# Patient Record
Sex: FEMALE | Race: WHITE | NOT HISPANIC OR LATINO | URBAN - METROPOLITAN AREA
[De-identification: names, ages, dates, MRNs, and addresses within clinical notes are randomized per-mention and may not be internally consistent; named-entity substitution may affect disease eponyms.]

---

## 2017-02-20 ENCOUNTER — CONSULT (OUTPATIENT)
Dept: URBAN - METROPOLITAN AREA CLINIC 64 | Facility: CLINIC | Age: 22
End: 2017-02-20
Payer: COMMERCIAL

## 2017-02-20 PROCEDURE — 99203 OFFICE O/P NEW LOW 30 MIN: CPT | Performed by: OPHTHALMOLOGY

## 2017-02-20 ASSESSMENT — INTRAOCULAR PRESSURE
OS: 19
OD: 19

## 2017-06-26 ENCOUNTER — FOLLOW UP ESTABLISHED (OUTPATIENT)
Dept: URBAN - METROPOLITAN AREA CLINIC 64 | Facility: CLINIC | Age: 22
End: 2017-06-26
Payer: COMMERCIAL

## 2017-06-26 DIAGNOSIS — H05.113 GRANULOMA OF BILATERAL ORBITS: Primary | ICD-10-CM

## 2017-06-26 PROCEDURE — 99214 OFFICE O/P EST MOD 30 MIN: CPT | Performed by: OPHTHALMOLOGY

## 2017-06-26 ASSESSMENT — INTRAOCULAR PRESSURE
OD: 17
OS: 17

## 2017-06-28 ENCOUNTER — FOLLOW UP ESTABLISHED (OUTPATIENT)
Dept: URBAN - METROPOLITAN AREA CLINIC 64 | Facility: CLINIC | Age: 22
End: 2017-06-28
Payer: COMMERCIAL

## 2017-06-28 DIAGNOSIS — H52.13 MYOPIA, BILATERAL: Primary | ICD-10-CM

## 2017-06-28 PROCEDURE — 92310 CONTACT LENS FITTING OU: CPT | Performed by: OPTOMETRIST

## 2017-06-28 PROCEDURE — 92015 DETERMINE REFRACTIVE STATE: CPT | Performed by: OPTOMETRIST

## 2017-06-28 PROCEDURE — 92014 COMPRE OPH EXAM EST PT 1/>: CPT | Performed by: OPTOMETRIST

## 2017-06-28 ASSESSMENT — KERATOMETRY
OS: 43.64
OD: 43.64

## 2017-06-28 ASSESSMENT — INTRAOCULAR PRESSURE
OD: 14
OS: 14

## 2017-06-28 ASSESSMENT — VISUAL ACUITY
OD: 20/20
OS: 20/20

## 2018-09-16 PROBLEM — Z00.00 ENCOUNTER FOR PREVENTIVE HEALTH EXAMINATION: Status: ACTIVE | Noted: 2018-09-16

## 2018-10-29 ENCOUNTER — APPOINTMENT (OUTPATIENT)
Dept: ENDOCRINOLOGY | Facility: CLINIC | Age: 23
End: 2018-10-29
Payer: MEDICAID

## 2018-10-29 VITALS
WEIGHT: 211 LBS | SYSTOLIC BLOOD PRESSURE: 109 MMHG | HEIGHT: 66 IN | DIASTOLIC BLOOD PRESSURE: 74 MMHG | HEART RATE: 84 BPM | BODY MASS INDEX: 33.91 KG/M2

## 2018-10-29 DIAGNOSIS — Z82.49 FAMILY HISTORY OF ISCHEMIC HEART DISEASE AND OTHER DISEASES OF THE CIRCULATORY SYSTEM: ICD-10-CM

## 2018-10-29 DIAGNOSIS — G93.2 BENIGN INTRACRANIAL HYPERTENSION: ICD-10-CM

## 2018-10-29 DIAGNOSIS — Z83.438 FAMILY HISTORY OF OTHER DISORDER OF LIPOPROTEIN METABOLISM AND OTHER LIPIDEMIA: ICD-10-CM

## 2018-10-29 PROCEDURE — 36415 COLL VENOUS BLD VENIPUNCTURE: CPT

## 2018-10-29 PROCEDURE — 99204 OFFICE O/P NEW MOD 45 MIN: CPT | Mod: 25

## 2018-11-08 LAB
ALBUMIN SERPL ELPH-MCNC: 4.8 G/DL
ALP BLD-CCNC: 81 U/L
ALT SERPL-CCNC: 10 U/L
ANION GAP SERPL CALC-SCNC: 19 MMOL/L
AST SERPL-CCNC: 15 U/L
BILIRUB SERPL-MCNC: 0.2 MG/DL
BUN SERPL-MCNC: 8 MG/DL
CALCIUM SERPL-MCNC: 10.3 MG/DL
CHLORIDE SERPL-SCNC: 100 MMOL/L
CO2 SERPL-SCNC: 24 MMOL/L
CORTIS SERPL-MCNC: 8.9 UG/DL
CREAT SERPL-MCNC: 0.75 MG/DL
GLUCOSE SERPL-MCNC: 90 MG/DL
IGF-1 INTERP: NORMAL
IGF-I BLD-MCNC: 311 NG/ML
MONOMERIC PROLACTIN (ICMA)*: 75 NG/ML
PERCENT MACROPROLACTIN: 0 %
POTASSIUM SERPL-SCNC: 4.2 MMOL/L
PROLACTIN SERPL-MCNC: 87.2 NG/ML
PROLACTIN, SERUM (ICMA)*: 75 NG/ML
PROT SERPL-MCNC: 7.9 G/DL
SODIUM SERPL-SCNC: 143 MMOL/L
T4 FREE SERPL-MCNC: 1 NG/DL
TSH SERPL-ACNC: 1.96 UIU/ML

## 2019-03-18 ENCOUNTER — LABORATORY RESULT (OUTPATIENT)
Age: 24
End: 2019-03-18

## 2019-03-18 ENCOUNTER — APPOINTMENT (OUTPATIENT)
Dept: ENDOCRINOLOGY | Facility: CLINIC | Age: 24
End: 2019-03-18
Payer: MEDICAID

## 2019-03-18 VITALS
SYSTOLIC BLOOD PRESSURE: 122 MMHG | BODY MASS INDEX: 32.3 KG/M2 | DIASTOLIC BLOOD PRESSURE: 89 MMHG | HEIGHT: 66 IN | WEIGHT: 201 LBS | HEART RATE: 110 BPM

## 2019-03-18 DIAGNOSIS — F41.9 ANXIETY DISORDER, UNSPECIFIED: ICD-10-CM

## 2019-03-18 DIAGNOSIS — J30.2 OTHER SEASONAL ALLERGIC RHINITIS: ICD-10-CM

## 2019-03-18 DIAGNOSIS — G43.909 MIGRAINE, UNSPECIFIED, NOT INTRACTABLE, W/OUT STATUS MIGRAINOSUS: ICD-10-CM

## 2019-03-18 PROCEDURE — 99214 OFFICE O/P EST MOD 30 MIN: CPT

## 2019-03-18 RX ORDER — FEXOFENADINE HCL 60 MG
CAPSULE ORAL
Refills: 0 | Status: ACTIVE | COMMUNITY

## 2019-03-21 LAB
BASOPHILS # BLD AUTO: 0.04 K/UL
BASOPHILS NFR BLD AUTO: 0.4 %
EOSINOPHIL # BLD AUTO: 0.04 K/UL
EOSINOPHIL NFR BLD AUTO: 0.4 %
ESTRADIOL SERPL-MCNC: 41 PG/ML
FSH SERPL-MCNC: 5.1 IU/L
HCT VFR BLD CALC: 37.5 %
HGB BLD-MCNC: 11 G/DL
IMM GRANULOCYTES NFR BLD AUTO: 0.3 %
LH SERPL-ACNC: 2.8 IU/L
LYMPHOCYTES # BLD AUTO: 2.42 K/UL
LYMPHOCYTES NFR BLD AUTO: 22.2 %
MAN DIFF?: NORMAL
MCHC RBC-ENTMCNC: 18.3 PG
MCHC RBC-ENTMCNC: 29.3 GM/DL
MCV RBC AUTO: 62.3 FL
MONOCYTES # BLD AUTO: 0.91 K/UL
MONOCYTES NFR BLD AUTO: 8.3 %
NEUTROPHILS # BLD AUTO: 7.47 K/UL
NEUTROPHILS NFR BLD AUTO: 68.4 %
PLATELET # BLD AUTO: 345 K/UL
PROLACTIN SERPL-MCNC: 109 NG/ML
RBC # BLD: 6.02 M/UL
RBC # FLD: 15.9 %
WBC # FLD AUTO: 10.91 K/UL

## 2019-03-21 NOTE — PHYSICAL EXAM
[Alert] : alert [No Acute Distress] : no acute distress [Healthy Appearance] : healthy appearance [Normal Sclera/Conjunctiva] : normal sclera/conjunctiva [Visual Fields Grossly Intact] : visual fields grossly intact [Normal Oropharynx] : the oropharynx was normal [No Neck Mass] : no neck mass was observed [Supple] : the neck was supple [No LAD] : no lymphadenopathy [Thyroid Not Enlarged] : the thyroid was not enlarged [No Thyroid Nodules] : there were no palpable thyroid nodules [Normal Rate and Effort] : normal respiratory rhythm and effort [Clear to Auscultation] : lungs were clear to auscultation bilaterally [Normal Rate] : heart rate was normal  [Normal S1, S2] : normal S1 and S2 [Regular Rhythm] : with a regular rhythm [No Edema] : there was no peripheral edema [No Stigmata of Cushings Syndrome] : no stigmata of cushings syndrome [Normal Gait] : normal gait [Normal Insight/Judgement] : insight and judgment were intact [Kyphosis] : no kyphosis present [Acanthosis Nigricans] : no acanthosis nigricans [de-identified] : no moon facies, no supraclavicular fat pads

## 2019-03-21 NOTE — ADDENDUM
[FreeTextEntry1] : Recent test results as below. Prolactin again elevated. White blood cell count a little elevated; red blood cell count/hemoglobin/mean cell volume low, consistent with iron deficiency. I left a message for call back and will send a letter with results. 3/21/19

## 2019-03-21 NOTE — ASSESSMENT
[FreeTextEntry1] : Elevated prolactin. She noted an increase in breast size and bilateral expressible milky nipple discharge starting in January 2018. Prolactin was noted to be elevated. MRI pituitary in August 2018 demonstrated a partially empty sella but no pituitary masses. Prolactin levels may be elevated due to a number of causes including medications, stress, hypothyroidism, macroprolactin, chronic renal failure, pituitary adenoma or stalk effect; all negative in her case. Her elevated prolactin may be due to partially empty sella versus microadenoma not visualized on imaging versus idiopathic hyperprolactinemia. Other pituitary hormones are within range. We discussed use of cabergoline to normalize prolactin and improve symptoms. We discussed the risks and benefits in detail, including but not limited to headache, nausea, dizziness. We will start cabergoline 0.25 mg weekly. She will call me if any issues arise. I requested she send a copy of her upcoming MRI report.\par \par Return to clinic in 3 months or earlier as needed.\par \par CC:\par Dr. Spenser Chamorro, Fax [phone]

## 2019-03-21 NOTE — DATA REVIEWED
[FreeTextEntry1] : Laboratories (June 23, 2018) reviewed and significant for:\par Prolactin 88.3 ng/mL (normal 4.8-23.3)\par \par Laboratories (March 17, 2018) reviewed and significant for:\par Prolactin 78.2 ng/mL (normal 4.8-23.3) \par \par MRI brain (August 27, 2018) reviewed and significant for:\par Partially empty sella but no pituitary masses

## 2019-06-18 ENCOUNTER — APPOINTMENT (OUTPATIENT)
Dept: ENDOCRINOLOGY | Facility: CLINIC | Age: 24
End: 2019-06-18

## 2019-10-04 ENCOUNTER — APPOINTMENT (OUTPATIENT)
Dept: ENDOCRINOLOGY | Facility: CLINIC | Age: 24
End: 2019-10-04
Payer: MEDICAID

## 2019-10-04 VITALS
BODY MASS INDEX: 36.32 KG/M2 | WEIGHT: 226 LBS | SYSTOLIC BLOOD PRESSURE: 114 MMHG | HEART RATE: 86 BPM | DIASTOLIC BLOOD PRESSURE: 70 MMHG | HEIGHT: 66 IN

## 2019-10-04 PROCEDURE — 99214 OFFICE O/P EST MOD 30 MIN: CPT

## 2019-10-04 RX ORDER — CHLORHEXIDINE GLUCONATE 4 %
LIQUID (ML) TOPICAL
Refills: 0 | Status: DISCONTINUED | COMMUNITY
End: 2019-10-04

## 2019-10-04 RX ORDER — ESCITALOPRAM OXALATE 5 MG/1
TABLET, FILM COATED ORAL
Refills: 0 | Status: ACTIVE | COMMUNITY

## 2019-10-07 LAB
ALBUMIN SERPL ELPH-MCNC: 4.4 G/DL
ALP BLD-CCNC: 77 U/L
ALT SERPL-CCNC: 11 U/L
ANION GAP SERPL CALC-SCNC: 13 MMOL/L
AST SERPL-CCNC: 19 U/L
BILIRUB SERPL-MCNC: 0.2 MG/DL
BUN SERPL-MCNC: 15 MG/DL
CALCIUM SERPL-MCNC: 9.9 MG/DL
CHLORIDE SERPL-SCNC: 100 MMOL/L
CO2 SERPL-SCNC: 26 MMOL/L
CREAT SERPL-MCNC: 0.73 MG/DL
GLUCOSE SERPL-MCNC: 93 MG/DL
POTASSIUM SERPL-SCNC: 4.4 MMOL/L
PROLACTIN SERPL-MCNC: 10.2 NG/ML
PROT SERPL-MCNC: 7.1 G/DL
SODIUM SERPL-SCNC: 139 MMOL/L

## 2019-10-07 NOTE — HISTORY OF PRESENT ILLNESS
[FreeTextEntry1] : Ms. Galan is a 24 year-old woman with a history of migraine headache, idiopathic intracranial hypertension, anxiety presenting for follow-up of elevated prolactin. I saw her for an initial visit in October 2018 and last in March 2019.\par \par Elevated prolactin.\par She noted an increase in breast size and bilateral expressible milky nipple discharge starting in January 2018. She had regular menstrual periods every 28 days.\par Prolactin was noted to be elevated on two occasions prior to initial evaluation here in March and June 2018,  78.2 ng/mL (normal 4.8-23.3) and 88.3 ng/mL, respectively. Laboratory evaluation in October 2018 showed an elevated prolactin at 87.2 pg/mL (normal: 3.4-24.1) with no significant macroprolactin and otherwise normal pituitary function.\par MRI pituitary in August 2018 demonstrated a partially empty sella but no pituitary masses. MRI pituitary in March 2019 showed a possible 2 mm focus of hypoenhancement in the right posterior aspect of the pituitary gland.\par She was not taking any medications at the time of her initial visit. She has a history of depression and anxiety, although she had not required any psychotropic medications since the fall of 2017.\par Her maternal grandmother has a history of type 2 diabetes mellitus. No other known history of endocrine disease or tumors.\par \par Interim History \par Laboratory evaluation from last visit as below. She started cabergoline 0.25 weekly in April 2019. She is tolerating well without side effects. \par MRI pituitary in March 2019 showed a possible 2 mm focus of hypoenhancement in the right posterior aspect of the pituitary gland.\par Anxiety under better control since starting escitalopram. Galactorrhea has resolved. Migraine headaches resolved. Menstrual periods remain regular. She is still working overnight. Weight gain from previous. She notes acne, currently under good control. \par No change in vision, diarrhea/constipation, hair/skin changes. \par Medical and surgical history, medications, allergies, social and family history reviewed and updated as needed.

## 2019-10-07 NOTE — ADDENDUM
[FreeTextEntry1] : Recent test results as below. Prolactin 10.2 ng/mL. We will continue cabergoline 0.25 mg weekly until her next appointment, and consider titrating down to every other week at that time. Other test results within range. 10/07/19

## 2019-10-07 NOTE — ASSESSMENT
[FreeTextEntry1] : Elevated prolactin. She noted an increase in breast size and bilateral expressible milky nipple discharge starting in January 2018. Prolactin was noted to be elevated. MRI pituitary in August 2018 demonstrated a partially empty sella but no pituitary masses. MRI pituitary in March 2019 showed a possible 2 mm focus of hypoenhancement in the right posterior aspect of the pituitary gland. Other pituitary hormones are within range. She started cabergoline 0.25 weekly in April 2019. She is tolerating well without side effects. We will check prolactin and a comprehensive metabolic panel today for monitoring. \par \par Weight gain. We reviewed lifestyle modifications for weight loss. We discussed referral to nutrition. \par \par Return to clinic in 6 months or earlier as needed.\par \par I reviewed the MRI imaging performed on March 27, 2019 with the patient today.\par I reviewed the laboratories performed on March 18, 2019 with the patient today. \par \par CC:\par Dr. Spenser Chamorro, Fax [phone]

## 2019-10-07 NOTE — PHYSICAL EXAM
[Alert] : alert [No Acute Distress] : no acute distress [Healthy Appearance] : healthy appearance [Normal Sclera/Conjunctiva] : normal sclera/conjunctiva [Visual Fields Grossly Intact] : visual fields grossly intact [Normal Oropharynx] : the oropharynx was normal [No Neck Mass] : no neck mass was observed [Supple] : the neck was supple [No LAD] : no lymphadenopathy [Thyroid Not Enlarged] : the thyroid was not enlarged [No Thyroid Nodules] : there were no palpable thyroid nodules [Normal Rate and Effort] : normal respiratory rhythm and effort [Clear to Auscultation] : lungs were clear to auscultation bilaterally [Normal Rate] : heart rate was normal  [Normal S1, S2] : normal S1 and S2 [Regular Rhythm] : with a regular rhythm [No Edema] : there was no peripheral edema [No Stigmata of Cushings Syndrome] : no stigmata of cushings syndrome [Normal Gait] : normal gait [Normal Insight/Judgement] : insight and judgment were intact [Acanthosis Nigricans] : no acanthosis nigricans [Kyphosis] : no kyphosis present [de-identified] : no moon facies, no supraclavicular fat pads

## 2019-10-17 ENCOUNTER — APPOINTMENT (OUTPATIENT)
Dept: ENDOCRINOLOGY | Facility: CLINIC | Age: 24
End: 2019-10-17

## 2020-09-28 ENCOUNTER — TRANSCRIPTION ENCOUNTER (OUTPATIENT)
Age: 25
End: 2020-09-28

## 2020-10-19 ENCOUNTER — RX RENEWAL (OUTPATIENT)
Age: 25
End: 2020-10-19

## 2021-02-01 ENCOUNTER — RX RENEWAL (OUTPATIENT)
Age: 26
End: 2021-02-01

## 2021-04-12 ENCOUNTER — APPOINTMENT (OUTPATIENT)
Dept: ENDOCRINOLOGY | Facility: CLINIC | Age: 26
End: 2021-04-12
Payer: MEDICAID

## 2021-04-12 VITALS
HEART RATE: 85 BPM | BODY MASS INDEX: 38.09 KG/M2 | HEIGHT: 66 IN | WEIGHT: 237 LBS | SYSTOLIC BLOOD PRESSURE: 128 MMHG | DIASTOLIC BLOOD PRESSURE: 75 MMHG

## 2021-04-12 DIAGNOSIS — D56.3 THALASSEMIA MINOR: ICD-10-CM

## 2021-04-12 PROCEDURE — 99072 ADDL SUPL MATRL&STAF TM PHE: CPT

## 2021-04-12 PROCEDURE — 99214 OFFICE O/P EST MOD 30 MIN: CPT | Mod: 25

## 2021-04-13 PROBLEM — D56.3 THALASSEMIA TRAIT: Status: ACTIVE | Noted: 2021-04-13

## 2021-04-13 LAB
25(OH)D3 SERPL-MCNC: 29.5 NG/ML
ALBUMIN SERPL ELPH-MCNC: 4.4 G/DL
ALP BLD-CCNC: 99 U/L
ALT SERPL-CCNC: 14 U/L
ANION GAP SERPL CALC-SCNC: 15 MMOL/L
AST SERPL-CCNC: 23 U/L
BASOPHILS # BLD AUTO: 0.07 K/UL
BASOPHILS NFR BLD AUTO: 0.7 %
BILIRUB SERPL-MCNC: 0.3 MG/DL
BUN SERPL-MCNC: 11 MG/DL
CALCIUM SERPL-MCNC: 9.8 MG/DL
CHLORIDE SERPL-SCNC: 102 MMOL/L
CO2 SERPL-SCNC: 24 MMOL/L
CREAT SERPL-MCNC: 0.81 MG/DL
EOSINOPHIL # BLD AUTO: 0.53 K/UL
EOSINOPHIL NFR BLD AUTO: 5.3 %
GLUCOSE SERPL-MCNC: 86 MG/DL
HCT VFR BLD CALC: 39.4 %
HGB BLD-MCNC: 11.4 G/DL
IMM GRANULOCYTES NFR BLD AUTO: 0.3 %
LYMPHOCYTES # BLD AUTO: 2.81 K/UL
LYMPHOCYTES NFR BLD AUTO: 27.9 %
MAN DIFF?: NORMAL
MCHC RBC-ENTMCNC: 18.2 PG
MCHC RBC-ENTMCNC: 28.9 GM/DL
MCV RBC AUTO: 62.8 FL
MONOCYTES # BLD AUTO: 0.87 K/UL
MONOCYTES NFR BLD AUTO: 8.6 %
NEUTROPHILS # BLD AUTO: 5.76 K/UL
NEUTROPHILS NFR BLD AUTO: 57.2 %
PLATELET # BLD AUTO: 394 K/UL
POTASSIUM SERPL-SCNC: 4.9 MMOL/L
PROLACTIN SERPL-MCNC: 28.1 NG/ML
PROT SERPL-MCNC: 7.3 G/DL
RBC # BLD: 6.27 M/UL
RBC # FLD: 18.7 %
SODIUM SERPL-SCNC: 142 MMOL/L
WBC # FLD AUTO: 10.07 K/UL

## 2021-06-12 ENCOUNTER — APPOINTMENT (OUTPATIENT)
Dept: MRI IMAGING | Facility: HOSPITAL | Age: 26
End: 2021-06-12

## 2021-07-03 ENCOUNTER — OUTPATIENT (OUTPATIENT)
Dept: OUTPATIENT SERVICES | Facility: HOSPITAL | Age: 26
LOS: 1 days | End: 2021-07-03
Payer: COMMERCIAL

## 2021-07-03 ENCOUNTER — APPOINTMENT (OUTPATIENT)
Dept: MRI IMAGING | Facility: HOSPITAL | Age: 26
End: 2021-07-03

## 2021-07-03 ENCOUNTER — RESULT REVIEW (OUTPATIENT)
Age: 26
End: 2021-07-03

## 2021-07-03 PROCEDURE — A9585: CPT

## 2021-07-03 PROCEDURE — 70553 MRI BRAIN STEM W/O & W/DYE: CPT

## 2021-07-03 PROCEDURE — 70553 MRI BRAIN STEM W/O & W/DYE: CPT | Mod: 26

## 2021-07-06 NOTE — PHYSICAL EXAM
[Alert] : alert [Healthy Appearance] : healthy appearance [No Acute Distress] : no acute distress [Normal Sclera/Conjunctiva] : normal sclera/conjunctiva [Visual Fields Grossly Intact] : visual fields grossly intact [Normal Hearing] : hearing was normal [No Stigmata of Cushings Syndrome] : no stigmata of Cushings Syndrome [Normal Gait] : normal gait [Normal Insight/Judgement] : insight and judgment were intact [Kyphosis] : no kyphosis present [Acanthosis Nigricans] : no acanthosis nigricans [de-identified] : no moon facies, no supraclavicular fat pads

## 2021-07-06 NOTE — HISTORY OF PRESENT ILLNESS
[FreeTextEntry1] : Ms. Galan is a 26 year-old woman with a history of migraine headache and idiopathic intracranial hypertension presenting for follow-up of elevated prolactin and pituitary microprolactinoma. I saw her for an initial visit in October 2018 and last in October 2019.\par \par Pituitary microadenoma. Elevated prolactin.\par She noted an increase in breast size and bilateral expressible milky nipple discharge starting in January 2018. She never had irregular menstrual periods.\par Prolactin was noted to be elevated on two occasions prior to initial evaluation here in March and June 2018,  78.2 ng/mL (normal 4.8-23.3) and 88.3 ng/mL, respectively. Laboratory evaluation in October 2018 demonstrated an elevated prolactin of 87.2 pg/mL (normal: 3.4-24.1) with no significant macroprolactin and otherwise normal pituitary function.\par We started cabergoline 0.25 mg weekly in April 2019. \par MRI pituitary in August 2018 demonstrated a partially empty sella but no pituitary masses. MRI pituitary in March 2019 showed a possible 2 mm focus of hypoenhancement in the right posterior aspect of the pituitary gland.\par Her maternal grandmother has a history of type 2 diabetes mellitus. No other known history of endocrine disease or tumors.\par \par Interim History \par Laboratory evaluation from last visit as below. Prolactin 10.2 ng/mL. We planned to continue cabergoline 0.25 mg weekly until her next appointment; she has been lost to follow-up during the pandemic.\par She missed her dose of cabergoline for about two weeks late last year and felt a little sweaty with heat intolerance. \par No galactorrhea or migraine headaches. Menstrual periods remain regular. Weight is down 13 pounds from her highest weight of 250 pounds during the pandemic. She no longer has to work overnight. No vision issues.\par Medical and surgical history, medications, allergies, social and family history reviewed and updated as needed.

## 2021-07-06 NOTE — ADDENDUM
[FreeTextEntry1] : Recent laboratory results as below; discussed with MsQueenie Angelia. Prolactin 28.1 ng/mL and recommended continuation of cabergoline 0.25 mg weekly. Complete blood count consistent with previous diagnosis of thalassemia trait. Other test results within range. 4/13/21\par \par Recent MRI pituitary demonstrated a 3 mm focus in the right pituitary gland consistent with an adenoma and partially empty sella. I will discuss with MsQueenie Angeila at her upcoming appointment. 7/06/21

## 2021-07-06 NOTE — ASSESSMENT
[FreeTextEntry1] : Pituitary microadenoma. Elevated prolactin. She noted an increase in breast size and bilateral expressible milky nipple discharge starting in January 2018. Prolactin was noted to be elevated. MRI pituitary in August 2018 demonstrated a partially empty sella but no pituitary masses. MRI pituitary in March 2019 showed a possible 2 mm focus of hypoenhancement in the right posterior aspect of the pituitary gland. Other pituitary hormones were within range. She started cabergoline 0.25 weekly in April 2019. She is tolerating well without side effects. We can start to taper off cabergoline pending prolactin level today. We will check an interval MRI pituitary for monitoring. \par \par Elevated body mass index. She has lost weight through lifestyle modifications. I congratulated her on her recent weight loss.\par \par Return to see me in 3 months.\par \par CC:\par Dr. Spenser Chamorro, Fax [phone]

## 2021-07-07 ENCOUNTER — APPOINTMENT (OUTPATIENT)
Dept: ENDOCRINOLOGY | Facility: CLINIC | Age: 26
End: 2021-07-07

## 2022-09-01 ENCOUNTER — APPOINTMENT (OUTPATIENT)
Dept: ENDOCRINOLOGY | Facility: CLINIC | Age: 27
End: 2022-09-01

## 2022-09-01 VITALS
WEIGHT: 242 LBS | HEIGHT: 66 IN | SYSTOLIC BLOOD PRESSURE: 122 MMHG | DIASTOLIC BLOOD PRESSURE: 82 MMHG | BODY MASS INDEX: 38.89 KG/M2 | HEART RATE: 109 BPM

## 2022-09-01 PROCEDURE — 36415 COLL VENOUS BLD VENIPUNCTURE: CPT

## 2022-09-01 PROCEDURE — 99214 OFFICE O/P EST MOD 30 MIN: CPT | Mod: 25

## 2022-09-01 RX ORDER — SPIRONOLACTONE 50 MG/1
TABLET ORAL
Refills: 0 | Status: ACTIVE | COMMUNITY

## 2022-09-02 ENCOUNTER — TRANSCRIPTION ENCOUNTER (OUTPATIENT)
Age: 27
End: 2022-09-02

## 2022-09-02 LAB
25(OH)D3 SERPL-MCNC: 27.4 NG/ML
ALBUMIN SERPL ELPH-MCNC: 4.5 G/DL
ALP BLD-CCNC: 112 U/L
ALT SERPL-CCNC: 14 U/L
ANION GAP SERPL CALC-SCNC: 16 MMOL/L
AST SERPL-CCNC: 20 U/L
BASOPHILS # BLD AUTO: 0.04 K/UL
BASOPHILS NFR BLD AUTO: 0.4 %
BILIRUB SERPL-MCNC: 0.3 MG/DL
BUN SERPL-MCNC: 13 MG/DL
CALCIUM SERPL-MCNC: 10.2 MG/DL
CHLORIDE SERPL-SCNC: 102 MMOL/L
CHOLEST SERPL-MCNC: 175 MG/DL
CO2 SERPL-SCNC: 24 MMOL/L
CREAT SERPL-MCNC: 0.84 MG/DL
EGFR: 96 ML/MIN/1.73M2
EOSINOPHIL # BLD AUTO: 0.07 K/UL
EOSINOPHIL NFR BLD AUTO: 0.6 %
ESTIMATED AVERAGE GLUCOSE: 114 MG/DL
GLUCOSE SERPL-MCNC: 118 MG/DL
HBA1C MFR BLD HPLC: 5.6 %
HCT VFR BLD CALC: 38 %
HDLC SERPL-MCNC: 61 MG/DL
HGB BLD-MCNC: 10.9 G/DL
IMM GRANULOCYTES NFR BLD AUTO: 0.2 %
LDLC SERPL CALC-MCNC: 99 MG/DL
LYMPHOCYTES # BLD AUTO: 2.45 K/UL
LYMPHOCYTES NFR BLD AUTO: 22.7 %
MAN DIFF?: NORMAL
MCHC RBC-ENTMCNC: 18.1 PG
MCHC RBC-ENTMCNC: 28.7 GM/DL
MCV RBC AUTO: 63.2 FL
MONOCYTES # BLD AUTO: 0.7 K/UL
MONOCYTES NFR BLD AUTO: 6.5 %
NEUTROPHILS # BLD AUTO: 7.51 K/UL
NEUTROPHILS NFR BLD AUTO: 69.6 %
NONHDLC SERPL-MCNC: 114 MG/DL
PLATELET # BLD AUTO: 423 K/UL
POTASSIUM SERPL-SCNC: 5 MMOL/L
PROLACTIN SERPL-MCNC: 13 NG/ML
PROT SERPL-MCNC: 7.4 G/DL
RBC # BLD: 6.01 M/UL
RBC # FLD: 19.3 %
SODIUM SERPL-SCNC: 142 MMOL/L
T4 FREE SERPL-MCNC: 1.2 NG/DL
TRIGL SERPL-MCNC: 72 MG/DL
TSH SERPL-ACNC: 1.31 UIU/ML
VIT B12 SERPL-MCNC: 574 PG/ML
WBC # FLD AUTO: 10.79 K/UL

## 2022-09-02 NOTE — PHYSICAL EXAM
[Alert] : alert [Healthy Appearance] : healthy appearance [No Acute Distress] : no acute distress [Normal Sclera/Conjunctiva] : normal sclera/conjunctiva [Visual Fields Grossly Intact] : visual fields grossly intact [Normal Hearing] : hearing was normal [No Stigmata of Cushings Syndrome] : no stigmata of Cushings Syndrome [Normal Gait] : normal gait [Normal Insight/Judgement] : insight and judgment were intact [Kyphosis] : no kyphosis present [Acanthosis Nigricans] : no acanthosis nigricans [de-identified] : no moon facies, no supraclavicular fat pads

## 2022-09-02 NOTE — ADDENDUM
[FreeTextEntry1] : Recent laboratory results as below. White blood cell count and platelet count borderline elevated; recommended follow-up with primary care. Results otherwise consistent with previous diagnosis of thalassemia trait. Glucose not fasting; hemoglobin A1c within range. Prolactin within range and recommend continuing current regimen of cabergoline. Other test results within range. I left a telephone message for Ms. Galan to discuss and will send a Portal message with results. 9/02/22

## 2022-09-02 NOTE — ASSESSMENT
[FreeTextEntry1] : Pituitary microadenoma. Elevated prolactin. She noted an increase in breast size and bilateral expressible milky nipple discharge starting in January 2018. Prolactin was noted to be elevated. MRI pituitary in August 2018 demonstrated a partially empty sella but no pituitary masses. MRI pituitary in July 2021 demonstrated a 3 mm focus in the right pituitary gland consistent with an adenoma and partially empty sella. Other pituitary hormones were within range. She started cabergoline 0.25 weekly in April 2019. She is tolerating well without side effects and we will titrate pending results today. We can consider interval MRI imaging in 3-5 years. \par \par Routine health. We will send laboratory evaluation as below. \par \par Return to see me in 6 months.

## 2022-09-02 NOTE — HISTORY OF PRESENT ILLNESS
[FreeTextEntry1] : Ms. Galan is a 27 year-old woman with a history of migraine headache and idiopathic intracranial hypertension presenting for follow-up of elevated prolactin and pituitary microprolactinoma. I saw her for an initial visit in October 2018 and last in April 2021.\par \par Pituitary microadenoma. Elevated prolactin.\par She noted an increase in breast size and bilateral expressible milky nipple discharge starting in January 2018. She never had irregular menstrual periods.\par Prolactin was noted to be elevated on two occasions prior to initial evaluation here in March and June 2018, 78.2 ng/mL (normal 4.8-23.3) and 88.3 ng/mL, respectively. Laboratory evaluation in October 2018 demonstrated an elevated prolactin of 87.2 pg/mL (normal: 3.4-24.1) with no significant macroprolactin and otherwise normal pituitary function.\par We started cabergoline 0.25 mg weekly in April 2019. \par MRI pituitary in August 2018 demonstrated a partially empty sella but no pituitary masses. MRI pituitary in March 2019 showed a possible 2 mm focus of hypoenhancement in the right posterior aspect of the pituitary gland. MRI pituitary in July 2021 demonstrated a 3 mm focus in the right pituitary gland consistent with an adenoma and partially empty sella. \par Her maternal grandmother has a history of type 2 diabetes mellitus. No other known history of endocrine disease or tumors.\par \par Interim History \par Laboratory evaluation from April 2021 as  below. Prolactin 28.1 ng/mL and recommended continuation of cabergoline 0.25 mg weekly. Complete blood count consistent with previous diagnosis of thalassemia trait. Other test results within range. \par MRI pituitary in July 2021 demonstrated a 3 mm focus in the right pituitary gland consistent with an adenoma and partially empty sella. \par Her dose of escitalopram was increased. She was started on spironolactone for acne.\par She has made significant lifestyle modifications since having COVID-19 infection in January of this year. \par No galactorrhea, migraine headaches, or vision issues. Menstrual periods remain regular. \par Medical and surgical history, medications, allergies, social and family history reviewed and updated as needed.

## 2023-08-11 ENCOUNTER — RX RENEWAL (OUTPATIENT)
Age: 28
End: 2023-08-11

## 2023-10-16 ENCOUNTER — RX RENEWAL (OUTPATIENT)
Age: 28
End: 2023-10-16

## 2024-03-12 ENCOUNTER — LABORATORY RESULT (OUTPATIENT)
Age: 29
End: 2024-03-12

## 2024-03-12 ENCOUNTER — APPOINTMENT (OUTPATIENT)
Dept: ENDOCRINOLOGY | Facility: CLINIC | Age: 29
End: 2024-03-12
Payer: COMMERCIAL

## 2024-03-12 VITALS
WEIGHT: 255 LBS | SYSTOLIC BLOOD PRESSURE: 139 MMHG | HEART RATE: 111 BPM | DIASTOLIC BLOOD PRESSURE: 83 MMHG | BODY MASS INDEX: 41.16 KG/M2

## 2024-03-12 DIAGNOSIS — D35.2 BENIGN NEOPLASM OF PITUITARY GLAND: ICD-10-CM

## 2024-03-12 DIAGNOSIS — E22.1 HYPERPROLACTINEMIA: ICD-10-CM

## 2024-03-12 DIAGNOSIS — N91.5 OLIGOMENORRHEA, UNSPECIFIED: ICD-10-CM

## 2024-03-12 DIAGNOSIS — E66.01 MORBID (SEVERE) OBESITY DUE TO EXCESS CALORIES: ICD-10-CM

## 2024-03-12 DIAGNOSIS — E23.6 OTHER DISORDERS OF PITUITARY GLAND: ICD-10-CM

## 2024-03-12 PROCEDURE — 99215 OFFICE O/P EST HI 40 MIN: CPT

## 2024-03-12 RX ORDER — CABERGOLINE 0.5 MG/1
0.5 TABLET ORAL
Qty: 6 | Refills: 2 | Status: ACTIVE | COMMUNITY
Start: 2019-03-18 | End: 1900-01-01

## 2024-03-12 RX ORDER — BACILLUS COAGULANS/INULIN 1B-250 MG
CAPSULE ORAL
Refills: 0 | Status: ACTIVE | COMMUNITY

## 2024-03-12 RX ORDER — BUPROPION HYDROCHLORIDE 75 MG/1
TABLET, FILM COATED ORAL
Refills: 0 | Status: ACTIVE | COMMUNITY

## 2024-03-13 LAB
FERRITIN SERPL-MCNC: 137 NG/ML
IRON SATN MFR SERPL: 14 %
IRON SERPL-MCNC: 44 UG/DL
TIBC SERPL-MCNC: 323 UG/DL
UIBC SERPL-MCNC: 280 UG/DL

## 2024-03-18 ENCOUNTER — NON-APPOINTMENT (OUTPATIENT)
Age: 29
End: 2024-03-18

## 2024-03-19 NOTE — HISTORY OF PRESENT ILLNESS
[FreeTextEntry1] : Ms. Galan is a 29 year-old woman with a history of migraine headache and idiopathic intracranial hypertension presenting for follow-up of elevated prolactin and pituitary microprolactinoma. I saw her for an initial visit in October 2018 and last in September 2022.  Pituitary microadenoma. Elevated prolactin. She noted an increase in breast size and bilateral expressible milky nipple discharge starting in January 2018. She never had irregular menstrual periods. Prolactin was noted to be elevated on two occasions prior to initial evaluation here in March and June 2018, 78.2 ng/mL (normal 4.8-23.3) and 88.3 ng/mL, respectively. Laboratory evaluation in October 2018 demonstrated an elevated prolactin of 87.2 pg/mL (normal: 3.4-24.1) with no significant macroprolactin and otherwise normal pituitary function. We started cabergoline 0.25 mg weekly in April 2019. She is currently taking cabergoline 0.25 mg weekly.  MRI pituitary in August 2018 demonstrated a partially empty sella but no pituitary masses. MRI pituitary in March 2019 showed a possible 2 mm focus of hypoenhancement in the right posterior aspect of the pituitary gland. MRI pituitary in July 2021 demonstrated a 3 mm focus in the right pituitary gland consistent with an adenoma and partially empty sella.  Her maternal grandmother has a history of type 2 diabetes mellitus. No other known history of endocrine disease or tumors.  Interim History  She is currently taking cabergoline 0.25 mg weekly.  She was started on bupropion.  Menstrual periods have recently been 39, 40, and 45 days in duration; previously 28-32 days.  No galactorrhea, migraine headaches, or vision issues.  She has a history of acne treated with spironolactone. No hirsutism or hair loss.  Medical and surgical history, medications, allergies, social and family history reviewed and updated as needed.

## 2024-03-19 NOTE — PHYSICAL EXAM
[Alert] : alert [Healthy Appearance] : healthy appearance [No Acute Distress] : no acute distress [Normal Sclera/Conjunctiva] : normal sclera/conjunctiva [Visual Fields Grossly Intact] : visual fields grossly intact [Normal Hearing] : hearing was normal [No Stigmata of Cushings Syndrome] : no stigmata of Cushings Syndrome [Normal Gait] : normal gait [Normal Insight/Judgement] : insight and judgment were intact [Kyphosis] : no kyphosis present [Acanthosis Nigricans] : no acanthosis nigricans [de-identified] : no moon facies, no supraclavicular fat pads

## 2024-03-19 NOTE — ADDENDUM
[FreeTextEntry1] : Recent laboratory results as below. White blood cell count and platelet counts elevated and red blood cell morphology abnormal, consistent with thalassemia trait; recommend follow-up with primary care and/or hematology. Prolactin within range and recommend continuing her current regimen of cabergoline. Evaluation for other causes of oligomenorrhea otherwise unremarkable. Total alkaline phosphatase borderline elevated and recommend follow-up with primary care. Lifestyle modifications recommended for lipid panel. HbA1c and other test results within range. Vitamin D within the standard reference range of 20-50 ng/mL. Other test results within range. I left a telephone message for Ms. Galan to discuss. 3/19/24

## 2024-03-19 NOTE — DATA REVIEWED
[FreeTextEntry1] : MRI brain (August 27, 2018) reviewed and significant for: Partially empty sella but no pituitary masses

## 2024-03-19 NOTE — ASSESSMENT
[FreeTextEntry1] : Pituitary microadenoma. Elevated prolactin. She noted an increase in breast size and bilateral expressible milky nipple discharge starting in January 2018. Prolactin was noted to be elevated. MRI pituitary in August 2018 demonstrated a partially empty sella but no pituitary masses. MRI pituitary in July 2021 demonstrated a 3 mm focus in the right pituitary gland consistent with an adenoma and partially empty sella. Other pituitary hormones were within range. She started cabergoline 0.25 weekly in April 2019. She is tolerating well without side effects and we will titrate pending results today. We can consider interval MRI imaging in 3-5 years.   Oligomenorrhea. She meets criteria for polycystic ovarian syndrome in the setting of oligomenorrhea and clinical evidence of hyperandrogenism. We discussed evaluation for secondary causes of irregular menses. We discussed the potential risks of polycystic ovarian syndrome, including but not limited to endometrial hyperplasia/cancer, issues with androgen excess, metabolic effects/insulin resistance. She was advised to call in the absence of a menstrual period for 90 days to discuss progesterone therapy. We discussed that weight loss should improve her symptoms.   Elevated body mass index. We reviewed lifestyle modifications for weight loss. She declined referral to nutrition but may consider later in the year once she meets her deductible. We discussed pharmacologic options for weight loss.   She is amenable to a trial of a GLP-1 receptor agonist pending insurance authorization. We discussed the risks and benefits of the GLP-1 receptor agonist class, including but not limited to nausea, pancreatitis, medullary thyroid cancer.   Return to see me in 4 months.

## 2024-05-28 RX ORDER — SEMAGLUTIDE 0.5 MG/.5ML
0.5 INJECTION, SOLUTION SUBCUTANEOUS
Qty: 1 | Refills: 5 | Status: ACTIVE | COMMUNITY
Start: 2024-03-12 | End: 1900-01-01

## 2024-07-19 ENCOUNTER — APPOINTMENT (OUTPATIENT)
Dept: ENDOCRINOLOGY | Facility: CLINIC | Age: 29
End: 2024-07-19
Payer: COMMERCIAL

## 2024-07-19 ENCOUNTER — APPOINTMENT (OUTPATIENT)
Dept: ENDOCRINOLOGY | Facility: CLINIC | Age: 29
End: 2024-07-19

## 2024-07-19 VITALS
BODY MASS INDEX: 37.18 KG/M2 | WEIGHT: 230.38 LBS | HEART RATE: 112 BPM | SYSTOLIC BLOOD PRESSURE: 109 MMHG | DIASTOLIC BLOOD PRESSURE: 74 MMHG

## 2024-07-19 DIAGNOSIS — E22.1 HYPERPROLACTINEMIA: ICD-10-CM

## 2024-07-19 DIAGNOSIS — D35.2 BENIGN NEOPLASM OF PITUITARY GLAND: ICD-10-CM

## 2024-07-19 DIAGNOSIS — E66.9 OBESITY, UNSPECIFIED: ICD-10-CM

## 2024-07-19 PROCEDURE — G2211 COMPLEX E/M VISIT ADD ON: CPT

## 2024-07-19 PROCEDURE — 99214 OFFICE O/P EST MOD 30 MIN: CPT

## 2024-12-02 ENCOUNTER — APPOINTMENT (OUTPATIENT)
Dept: ENDOCRINOLOGY | Facility: CLINIC | Age: 29
End: 2024-12-02
Payer: COMMERCIAL

## 2024-12-02 VITALS
BODY MASS INDEX: 34.54 KG/M2 | DIASTOLIC BLOOD PRESSURE: 80 MMHG | SYSTOLIC BLOOD PRESSURE: 123 MMHG | WEIGHT: 214 LBS | HEART RATE: 132 BPM

## 2024-12-02 DIAGNOSIS — D35.2 BENIGN NEOPLASM OF PITUITARY GLAND: ICD-10-CM

## 2024-12-02 DIAGNOSIS — E22.1 HYPERPROLACTINEMIA: ICD-10-CM

## 2024-12-02 PROCEDURE — 99213 OFFICE O/P EST LOW 20 MIN: CPT
